# Patient Record
Sex: FEMALE | Race: WHITE | NOT HISPANIC OR LATINO | URBAN - NONMETROPOLITAN AREA
[De-identification: names, ages, dates, MRNs, and addresses within clinical notes are randomized per-mention and may not be internally consistent; named-entity substitution may affect disease eponyms.]

---

## 2019-05-01 ENCOUNTER — IMPORTED ENCOUNTER (OUTPATIENT)
Dept: URBAN - NONMETROPOLITAN AREA CLINIC 1 | Facility: CLINIC | Age: 34
End: 2019-05-01

## 2019-05-01 PROBLEM — H02.054: Noted: 2019-05-01

## 2019-05-01 PROCEDURE — 99203 OFFICE O/P NEW LOW 30 MIN: CPT

## 2019-05-01 NOTE — PATIENT DISCUSSION
Trichiasis OU - Discussed diagnosis in detail with patient - Removed 1 lash from puncta OU with forceps without complications - Sample of Alrex given today TID OU - Continue to monitor - RTC PRN or Complete

## 2019-10-23 NOTE — PATIENT DISCUSSION
New Prescription: Bactrim (sulfamethoxazole-trimethoprim): tablet: 400-80 mg 1 tablet twice a day by mouth 10-

## 2019-10-23 NOTE — PATIENT DISCUSSION
Prescribe warm compresses 10 minutes BID-TID OD, Maxitrol ailyn BID OD for up to 2 weeks, and Bactrim (okay to begin) as directed by Dr. Christa March. Discussed signs and symptoms of orbital cellulitis. Patient is to call immediately or go to ER if fever onsets or if swelling spreads to other areas of the face. Otherwise, follow up in 2 days.

## 2019-10-23 NOTE — PATIENT DISCUSSION
MAURY, OD: Not improving with Z-beto. Finish course of Z-beto as directed. Discontinue Erythromycin ailyn.

## 2019-10-23 NOTE — PATIENT DISCUSSION
New Prescription: Maxitrol (neomycin-polymyxin-dexameth): ointment: 3.5-10,000-0.1 mg-unit/g-% 1 a small amount twice a day into right eye 10-

## 2019-10-23 NOTE — PATIENT DISCUSSION
New Prescription: erythromycin (erythromycin): ointment: 5 mg/gram (0.5 %) a small amount twice a day as directed into right eye

## 2022-04-09 ASSESSMENT — VISUAL ACUITY
OD_CC: 20/20-
OS_CC: 20/20-

## 2022-04-09 ASSESSMENT — TONOMETRY
OD_IOP_MMHG: 16
OS_IOP_MMHG: 16

## 2022-07-15 ENCOUNTER — NEW PATIENT (OUTPATIENT)
Dept: URBAN - NONMETROPOLITAN AREA CLINIC 1 | Facility: CLINIC | Age: 37
End: 2022-07-15

## 2022-07-15 DIAGNOSIS — H52.03: ICD-10-CM

## 2022-07-15 PROCEDURE — 92015 DETERMINE REFRACTIVE STATE: CPT

## 2022-07-15 PROCEDURE — 92004 COMPRE OPH EXAM NEW PT 1/>: CPT

## 2022-07-15 ASSESSMENT — VISUAL ACUITY
OD_SC: 20/20-1
OS_SC: 20/20-1

## 2022-07-15 ASSESSMENT — TONOMETRY
OS_IOP_MMHG: 15
OD_IOP_MMHG: 15

## 2023-09-15 ENCOUNTER — ESTABLISHED PATIENT (OUTPATIENT)
Dept: URBAN - NONMETROPOLITAN AREA CLINIC 1 | Facility: CLINIC | Age: 38
End: 2023-09-15

## 2023-09-15 DIAGNOSIS — H52.03: ICD-10-CM

## 2023-09-15 DIAGNOSIS — H52.223: ICD-10-CM

## 2023-09-15 PROCEDURE — 92014 COMPRE OPH EXAM EST PT 1/>: CPT

## 2023-09-15 PROCEDURE — 92015 DETERMINE REFRACTIVE STATE: CPT

## 2023-09-15 ASSESSMENT — TONOMETRY
OD_IOP_MMHG: 18
OS_IOP_MMHG: 18

## 2023-09-15 ASSESSMENT — VISUAL ACUITY
OS_SC: 20/25
OU_SC: 20/20
OD_SC: 20/25